# Patient Record
Sex: MALE | Race: WHITE | NOT HISPANIC OR LATINO | URBAN - METROPOLITAN AREA
[De-identification: names, ages, dates, MRNs, and addresses within clinical notes are randomized per-mention and may not be internally consistent; named-entity substitution may affect disease eponyms.]

---

## 2020-11-05 ENCOUNTER — IMPORTED ENCOUNTER (OUTPATIENT)
Dept: URBAN - METROPOLITAN AREA CLINIC 31 | Facility: CLINIC | Age: 75
End: 2020-11-05

## 2020-11-05 PROBLEM — H25.813: Noted: 2020-11-05

## 2020-11-05 PROBLEM — H17.823: Noted: 2020-11-05

## 2020-11-05 PROBLEM — H18.592: Noted: 2020-11-05

## 2020-11-05 PROBLEM — H18.591: Noted: 2020-11-05

## 2020-11-05 PROCEDURE — 92004 COMPRE OPH EXAM NEW PT 1/>: CPT

## 2020-11-05 PROCEDURE — 92015 DETERMINE REFRACTIVE STATE: CPT

## 2020-11-05 PROCEDURE — 92285 EXTERNAL OCULAR PHOTOGRAPHY: CPT

## 2020-11-05 NOTE — PATIENT DISCUSSION
1.  Discussed the risks benefits alternatives and limitations of cataract surgery including infection bleeding loss of vision retinal tears detachment. The patient stated a full understanding and a desire to proceed with the procedure in both eyes. Refractive options were reviewed. Patient has elected to be optimized for distance vision in both eyes. The patient will still need glasses for reading and to possibly fine tune distance vision. Can schedule cataraact surgery OD/OS. I did advise that he has an optional ( covered by insurance) [address] OU to remove jesús nodules ( scars) 1-2 months befor cataract surgery2. Kris Andrade Discussed the risks benefits and alternatives to [address]  including infection success rate and recurrence. Schedule if desired. 3.   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

## 2020-11-05 NOTE — PATIENT DISCUSSION
.  Discussed the risks benefits and alternatives to [address]  including infection success rate and recurrence. Schedule if desired.

## 2020-11-05 NOTE — PATIENT DISCUSSION
Pt is symptomatic despite using marco qhs. Discussed the risks benefits and alternatives to superficial keratectomy including infection success rate and recurrence. Schedule if desired.

## 2022-04-01 ASSESSMENT — TONOMETRY
OS_IOP_MMHG: 20
OD_IOP_MMHG: 21

## 2022-04-01 ASSESSMENT — VISUAL ACUITY
OD_SC: 20/50
OD_PH: CC 20/40 -2
OS_CC: 20/70
OS_SC: 20/100
OS_PH: CC 20/50
OD_CC: 20/70

## 2022-06-08 ENCOUNTER — ESTABLISHED PATIENT (OUTPATIENT)
Dept: URBAN - METROPOLITAN AREA CLINIC 29 | Facility: CLINIC | Age: 77
End: 2022-06-08

## 2022-06-08 DIAGNOSIS — H52.203: ICD-10-CM

## 2022-06-08 DIAGNOSIS — H18.593: ICD-10-CM

## 2022-06-08 DIAGNOSIS — H49.12: ICD-10-CM

## 2022-06-08 DIAGNOSIS — H53.2: ICD-10-CM

## 2022-06-08 DIAGNOSIS — H18.453: ICD-10-CM

## 2022-06-08 DIAGNOSIS — L71.9: ICD-10-CM

## 2022-06-08 PROCEDURE — 92015 DETERMINE REFRACTIVE STATE: CPT

## 2022-06-08 PROCEDURE — 99214 OFFICE O/P EST MOD 30 MIN: CPT

## 2022-06-08 PROCEDURE — 92060 SENSORIMOTOR EXAMINATION: CPT

## 2022-06-08 ASSESSMENT — VISUAL ACUITY
OS_SC: 20/150
OS_CC: 20/70
OD_CC: 20/40
OD_SC: 20/50

## 2022-06-08 ASSESSMENT — TONOMETRY
OS_IOP_MMHG: 14
OD_IOP_MMHG: 14

## 2022-06-08 NOTE — PATIENT DISCUSSION
Suspect corneal change most responsible for acuity issues. There has been a glasses prescription change but recommend cornea evaluation first before updating glasses.  Macula OCT was normal.

## 2022-07-25 ENCOUNTER — CONSULTATION/EVALUATION (OUTPATIENT)
Dept: URBAN - METROPOLITAN AREA CLINIC 29 | Facility: CLINIC | Age: 77
End: 2022-07-25

## 2022-07-25 DIAGNOSIS — H18.593: ICD-10-CM

## 2022-07-25 DIAGNOSIS — H18.453: ICD-10-CM

## 2022-07-25 PROCEDURE — 92025 CPTRIZED CORNEAL TOPOGRAPHY: CPT

## 2022-07-25 PROCEDURE — 92012 INTRM OPH EXAM EST PATIENT: CPT

## 2022-07-25 ASSESSMENT — VISUAL ACUITY
OS_CC: 20/80-1
OS_PH: 20/50
OD_CC: 20/30-2

## 2022-07-25 NOTE — PATIENT DISCUSSION
Risk, benefits, and alternatives to superficial keratectomy.  Schedule OS w/ [facility]. Had trouble with BCL OD in past will see for 1 day postop to make sure BCL fits well.

## 2022-07-25 NOTE — PATIENT DISCUSSION
ABMD partially accounts for the patient's complaints. Discussed all risks, benefits, procedures and recovery. Patient understands changing glasses will not improve vision. Patient desires to have surgery, recommend superficial keratectomy.

## 2022-08-09 ENCOUNTER — SURGERY/PROCEDURE (OUTPATIENT)
Dept: URBAN - METROPOLITAN AREA SURGERY 17 | Facility: SURGERY | Age: 77
End: 2022-08-09

## 2022-08-09 DIAGNOSIS — H18.452: ICD-10-CM

## 2022-08-09 PROCEDURE — 65400 REMOVAL OF EYE LESION: CPT

## 2022-08-10 ENCOUNTER — POST-OP (OUTPATIENT)
Dept: URBAN - METROPOLITAN AREA CLINIC 29 | Facility: CLINIC | Age: 77
End: 2022-08-10

## 2022-08-10 DIAGNOSIS — H18.453: ICD-10-CM

## 2022-08-10 DIAGNOSIS — H18.593: ICD-10-CM

## 2022-08-10 PROCEDURE — 66999PO NON CO-MANAGED OTHER SURGERY PO

## 2022-08-10 ASSESSMENT — VISUAL ACUITY
OD_CC: 20/30-2
OS_CC: 20/200
OS_PH: 20/30-2

## 2022-08-10 NOTE — PATIENT DISCUSSION
Continue po drops as directed, BCL continue.  No water in eyes when bathing.  Call with any problems.

## 2022-08-11 ENCOUNTER — POST-OP (OUTPATIENT)
Dept: URBAN - METROPOLITAN AREA CLINIC 29 | Facility: CLINIC | Age: 77
End: 2022-08-11

## 2022-08-11 DIAGNOSIS — H18.453: ICD-10-CM

## 2022-08-11 DIAGNOSIS — H18.593: ICD-10-CM

## 2022-08-11 PROCEDURE — 66999PO NON CO-MANAGED OTHER SURGERY PO

## 2022-08-11 ASSESSMENT — VISUAL ACUITY
OS_CC: 20/200
OD_CC: 20/30
OS_PH: 20/40

## 2022-08-11 NOTE — PATIENT DISCUSSION
Continue po drops as directed, BCL exchanged without incident.  No water in eyes when bathing.  Call with any problems. residual defect present 2x5 centrally.

## 2022-08-15 ENCOUNTER — POST-OP (OUTPATIENT)
Dept: URBAN - METROPOLITAN AREA CLINIC 29 | Facility: CLINIC | Age: 77
End: 2022-08-15

## 2022-08-15 DIAGNOSIS — H18.593: ICD-10-CM

## 2022-08-15 DIAGNOSIS — H18.453: ICD-10-CM

## 2022-08-15 PROCEDURE — 66999PO NON CO-MANAGED OTHER SURGERY PO

## 2022-08-15 ASSESSMENT — VISUAL ACUITY
OS_CC: 20/40
OD_CC: 20/25-2

## 2022-09-06 ENCOUNTER — POST-OP (OUTPATIENT)
Dept: URBAN - METROPOLITAN AREA CLINIC 29 | Facility: CLINIC | Age: 77
End: 2022-09-06

## 2022-09-06 DIAGNOSIS — H18.593: ICD-10-CM

## 2022-09-06 DIAGNOSIS — H53.2: ICD-10-CM

## 2022-09-06 DIAGNOSIS — H18.453: ICD-10-CM

## 2022-09-06 DIAGNOSIS — H49.12: ICD-10-CM

## 2022-09-06 PROCEDURE — 66999PO NON CO-MANAGED OTHER SURGERY PO

## 2022-09-06 ASSESSMENT — VISUAL ACUITY
OD_CC: 20/40+2
OS_SC: 20/200
OS_CC: 20/200
OS_PH: 20/30

## 2022-09-06 NOTE — PATIENT DISCUSSION
mild erosion symptoms, surface not stable yet.  AT 4x/d, marco qhs, restart PRED qd Hold on glasses until next visit.

## 2022-09-20 ENCOUNTER — FOLLOW UP (OUTPATIENT)
Dept: URBAN - METROPOLITAN AREA CLINIC 29 | Facility: CLINIC | Age: 77
End: 2022-09-20

## 2022-09-20 DIAGNOSIS — Z96.1: ICD-10-CM

## 2022-09-20 DIAGNOSIS — H04.123: ICD-10-CM

## 2022-09-20 DIAGNOSIS — H18.593: ICD-10-CM

## 2022-09-20 DIAGNOSIS — L71.9: ICD-10-CM

## 2022-09-20 PROCEDURE — 66999PO NON CO-MANAGED OTHER SURGERY PO

## 2022-09-20 ASSESSMENT — VISUAL ACUITY
OS_CC: 20/400
OS_PH: 20/40
OD_CC: 20/25+1

## 2024-08-28 ENCOUNTER — COMPREHENSIVE EXAM (OUTPATIENT)
Dept: URBAN - METROPOLITAN AREA CLINIC 29 | Facility: CLINIC | Age: 79
End: 2024-08-28

## 2024-08-28 DIAGNOSIS — H52.4: ICD-10-CM

## 2024-08-28 DIAGNOSIS — L71.9: ICD-10-CM

## 2024-08-28 DIAGNOSIS — H53.2: ICD-10-CM

## 2024-08-28 DIAGNOSIS — H18.451: ICD-10-CM

## 2024-08-28 DIAGNOSIS — H18.593: ICD-10-CM

## 2024-08-28 DIAGNOSIS — H04.123: ICD-10-CM

## 2024-08-28 DIAGNOSIS — H49.12: ICD-10-CM

## 2024-08-28 DIAGNOSIS — H52.223: ICD-10-CM

## 2024-08-28 DIAGNOSIS — Z96.1: ICD-10-CM

## 2024-08-28 DIAGNOSIS — H52.13: ICD-10-CM

## 2024-08-28 PROCEDURE — 92015 DETERMINE REFRACTIVE STATE: CPT

## 2024-08-28 PROCEDURE — 92060 SENSORIMOTOR EXAMINATION: CPT

## 2024-08-28 PROCEDURE — 99214 OFFICE O/P EST MOD 30 MIN: CPT

## 2024-08-28 ASSESSMENT — VISUAL ACUITY
OD_CC: 20/30-1
OS_PH: 20/50+2
OS_CC: 20/200+1